# Patient Record
(demographics unavailable — no encounter records)

---

## 2025-03-04 NOTE — HISTORY OF PRESENT ILLNESS
[FreeTextEntry1] : Here to es [de-identified] : 24 year old woman with a history of fatty liver, chronic left hip pain , femoral acetabular impingement, with tingling and numbness in her left left. Seen by  [facility], and Neurology with follow up appts in March

## 2025-03-04 NOTE — ASSESSMENT
[FreeTextEntry1] : 1 normal PE 2. fatty liver: recommended to have a fiber scan will do after her boards 3 mood/dep : was on zoloft in the past, did not feel that it was helpful, is in no way either suicidal by ideation or intent and agrees to speak with JUAN Kern. I have spent 5 min  discussing how this could make her feel better and she agreed to speak with Psychologist.  4 Obesity: as she is  of descent and BMI > 27 she is a candidate for medication not wellbutrin as she is an anxious more than depressed pt, consider glp, vs topamax.  5 hip consider acupuncture 6 HCM:  received flu vaccine, labs today, MMR and V titers

## 2025-03-04 NOTE — HEALTH RISK ASSESSMENT
[Fair] :  ~his/her~ mood as fair [Yes] : Yes [Monthly or less (1 pt)] : Monthly or less (1 point) [1 or 2 (0 pts)] : 1 or 2 (0 points) [Never (0 pts)] : Never (0 points) [One fall no injury in past year] : Patient reported one fall in the past year without injury [2] : 2) Feeling down, depressed, or hopeless for more than half of the days (2) [PHQ-9 Positive] : PHQ-9 Positive [I have developed a follow-up plan documented below in the note.] : I have developed a follow-up plan documented below in the note. [Time Spent: ___ Minutes] : I spent [unfilled] minutes performing a depression screening for this patient. [Never] : Never [NO] : No [Patient reported PAP Smear was normal] : Patient reported PAP Smear was normal [None] : None [Student] : student [Graduate School] : graduate school [Single] : single [Sexually Active] : sexually active [Fully functional (bathing, dressing, toileting, transferring, walking, feeding)] : Fully functional (bathing, dressing, toileting, transferring, walking, feeding) [Fully functional (using the telephone, shopping, preparing meals, housekeeping, doing laundry, using] : Fully functional and needs no help or supervision to perform IADLs (using the telephone, shopping, preparing meals, housekeeping, doing laundry, using transportation, managing medications and managing finances) [Smoke Detector] : smoke detector [Carbon Monoxide Detector] : carbon monoxide detector [Seat Belt] :  uses seat belt [Sunscreen] : uses sunscreen [With Patient/Caregiver] : , with patient/caregiver [Several Days (1)] : 2.) Feeling down, depressed or hopeless? Several days [Nearly Every Day (3)] : 3.) Trouble falling asleep, or sleeping too much? Nearly every day [1/2 of Days or More (2)] : 6.) Feeling bad about yourself, or that you are a failure, or have let yourself or your family down? Half the days or more [Not at All (0)] : 9.) Thoughts that you would be off dead or of hurting yourself in some way? Not at all [Moderate] : Severity of Depression is Moderate [Not at all] : How difficult have these problems made it for you to do your work, take care of things at home, or get along with people? Not at all [de-identified] : walk [[facility]] : 2 [[id number]] : 4 [[id number]] : 11 [Change in mental status noted] : No change in mental status noted [High Risk Behavior] : no high risk behavior [Reports changes in vision] : Reports no changes in vision [PapSmearDate] : 11/24 [AdvancecareDate] : 3.4.25

## 2025-03-04 NOTE — PHYSICAL EXAM
[Normal Appearance] : normal in appearance [No Masses] : no palpable masses [No Nipple Discharge] : no nipple discharge [Normal] : soft, non-tender, non-distended, no masses palpated, no HSM and normal bowel sounds [de-identified] : flat affect

## 2025-04-29 NOTE — ASSESSMENT
[FreeTextEntry1] : 1 Psych: cont to be evaluated , if it is determined that all she heeds is an SSRI I will be able to prescribe, if she is bipolar then she will need psych.  2. fatty liver: fibro scan was 0-1 adn 67% fatty liver, needs to work on  her wt.  3  /00517/204 4 Obesity: as she is  of descent and BMI > 27 she is a candidate for medication not wellbutrin as she is an anxious more than depressed pt, consider glp, vs topamax. No eating after dinner, 2 hours betw dinner and bed, will start with metformin:  5 hip consider acupuncture, feeing better.  6 HCM:  received flu vaccine, labs today, MMR and V titers f/u 6 weeks.

## 2025-04-29 NOTE — HISTORY OF PRESENT ILLNESS
[FreeTextEntry1] : Here for follow [de-identified] : Feeling about the same, more anxious lately, has met with both psycyologist and psych and being eval for bipolar. Fibroscan neg + fatty liver

## 2025-06-09 NOTE — HISTORY OF PRESENT ILLNESS
[FreeTextEntry8] : 25 year old female with PMH of fatty liver and obesity  Presents for acute visit for UTI symptoms  Having urinary urgency, frequency and dysuria for past 2 days.  Having mild left sided back pain.  No fevers, chills, lower abdominal pain or hematuria.

## 2025-06-09 NOTE — REVIEW OF SYSTEMS
[Dysuria] : dysuria [Frequency] : frequency [Negative] : Respiratory [FreeTextEntry8] : urinary urgency

## 2025-06-09 NOTE — PHYSICAL EXAM
[Normal] : normal rate, regular rhythm, normal S1 and S2 and no murmur heard [No CVA Tenderness] : no CVA  tenderness [de-identified] : left sided back pain.

## 2025-06-09 NOTE — PLAN
[FreeTextEntry1] : 1. UTI symptoms  -U/A with culture ordered. -Macrobid sent to pharmacy  -Increase hydration.

## 2025-06-12 NOTE — HISTORY OF PRESENT ILLNESS
[FreeTextEntry1] : Here for follow up for her  wtmanagement  and anxiety  [de-identified] : On the metformin and down 8 lbs Was recently in [place], and had a great time.  Breakfast:  Matcha egg breakfast sandwich  bagel  snack  none  Lunch  sushi water  snack pop corn   dinner: 7: 30  pasta: with sauce, veggies  drink: water or sugary drinks dessert: none   snack  gen none

## 2025-06-12 NOTE — ASSESSMENT
[FreeTextEntry1] : 1 Psych:  seen for evaluation as she suspects she has bipolar disease told in the past. re start wtih JUAN Kern, refusing I call for her 2fatty liver, needs to work on  her wt. Now down 7 lbs, on metformin, fibro scan irena 3  /08095/204 4 Obesity: as she is  of descent and BMI > 27 she is a candidate for medication not wellbutrin as she is an anxious more than depressed pt, consider glp, vs topamax. No eating after dinner, 2 hours betw dinner and bed, will contine on the metformin: 1500 just started  5 hip consider acupuncture, feeing better.  6 HCM:  received flu vaccine, labs today, MMR and V titers f/u 6 - 8 weeks.  has a renewal